# Patient Record
Sex: FEMALE | Race: WHITE | Employment: FULL TIME | URBAN - METROPOLITAN AREA
[De-identification: names, ages, dates, MRNs, and addresses within clinical notes are randomized per-mention and may not be internally consistent; named-entity substitution may affect disease eponyms.]

---

## 2019-11-20 ENCOUNTER — OFFICE VISIT (OUTPATIENT)
Dept: SURGERY | Age: 39
End: 2019-11-20

## 2019-11-20 VITALS
BODY MASS INDEX: 26.53 KG/M2 | SYSTOLIC BLOOD PRESSURE: 111 MMHG | DIASTOLIC BLOOD PRESSURE: 64 MMHG | WEIGHT: 169 LBS | HEIGHT: 67 IN | HEART RATE: 64 BPM

## 2019-11-20 DIAGNOSIS — D48.62 NEOPLASM OF UNCERTAIN BEHAVIOR OF LEFT BREAST: Primary | ICD-10-CM

## 2019-11-20 RX ORDER — ESCITALOPRAM OXALATE 10 MG/1
TABLET ORAL
COMMUNITY

## 2019-11-20 NOTE — LETTER
2019 9:53 AM 
 
Patient:  Andre Lazo YOB: 1980 Date of Visit: 2019 Dear Dr. Minaya Nessa: 
 
 
Thank you for referring Ms. Lester Wahl to me for evaluation/treatment. Below are the relevant portions of my assessment and plan of care. HISTORY OF PRESENT ILLNESS Andre Lazo is a 37 y.o. female. HPI 
NEW patient consult referred by  Dr. Candida Tello for LEFT breast atypical adipose tissue found on LEFT STBX. She went for screening mammogram and called back with LEFT dx mammogram showing architectural distortion. LEFT STBX pathology was atypical adipose tissue. She has no palpable lumps, nipple problems or skin changes. She is here with her . 
  
Family History: Mother, breast cancer, dx about age 61, survivor MGM, colon cancer, dx age unknown,  from other reason in her [de-identified] 
  
Imaging at [facility]: 
Mammogram, 10/24/19, BILATERAL screening, BIRADS 0 LEFT DX mammogram, 19, BIRADS 4 LEFT STBX done 19 
  
 
Past Medical History:  
Diagnosis Date  Thyroid disease   
 hyperthyroidism Past Surgical History:  
Procedure Laterality Date  HX WISDOM TEETH EXTRACTION Social History Socioeconomic History  Marital status:  Spouse name: Not on file  Number of children: Not on file  Years of education: Not on file  Highest education level: Not on file Occupational History  Not on file Social Needs  Financial resource strain: Not on file  Food insecurity:  
  Worry: Not on file Inability: Not on file  Transportation needs:  
  Medical: Not on file Non-medical: Not on file Tobacco Use  Smoking status: Never Smoker  Smokeless tobacco: Never Used Substance and Sexual Activity  Alcohol use: Yes Comment: once a month  Drug use: Never  Sexual activity: Not on file Lifestyle  Physical activity:  
  Days per week: Not on file Minutes per session: Not on file  Stress: Not on file Relationships  Social connections:  
  Talks on phone: Not on file Gets together: Not on file Attends Jewish service: Not on file Active member of club or organization: Not on file Attends meetings of clubs or organizations: Not on file Relationship status: Not on file  Intimate partner violence:  
  Fear of current or ex partner: Not on file Emotionally abused: Not on file Physically abused: Not on file Forced sexual activity: Not on file Other Topics Concern  Not on file Social History Narrative  Not on file Current Outpatient Medications on File Prior to Visit Medication Sig Dispense Refill  escitalopram oxalate (LEXAPRO) 10 mg tablet escitalopram 10 mg tablet 
 one po QD  methimazole (TAPAZOLE PO) methimazole No current facility-administered medications on file prior to visit. No Known Allergies OB History No obstetric history on file. Obstetric Comments Menarche 13, LMP 11/15/19, # of children 2, age of 1st delivery 27, Hysterectomy/oophorectomy no/no, Breast bx no, history of breast feeding yes, BCP yes, Hormone therapy no ROS Review of Systems Psychiatric/Behavioral: The patient is nervous/anxious. All other systems reviewed and are negative. Physical Exam 
Exam conducted with a chaperone present. Cardiovascular:  
   Rate and Rhythm: Normal rate and regular rhythm. Heart sounds: Normal heart sounds. Pulmonary:  
   Breath sounds: Normal breath sounds. Chest:  
   Breasts: Breasts are symmetrical.  
      Right: Normal. No swelling, bleeding, inverted nipple, mass, nipple discharge, skin change or tenderness. Left: Normal. No swelling, bleeding, inverted nipple, mass, nipple discharge, skin change or tenderness. Lymphadenopathy:  
   Cervical: Right cervical: No superficial, deep or posterior cervical adenopathy. Left cervical: No superficial, deep or posterior cervical adenopathy. Upper Body:  
   Right upper body: No supraclavicular or axillary adenopathy. Left upper body: No supraclavicular or axillary adenopathy. BREAST ULTRASOUND, Pre-op Planning Indication: Pre-op planning, LEFT breast UIQ Technique: The area was scanned using a high-frequency linear-array near-field transducer Findings: Bx clip and bx change visible on US at LEFT breast 11:00, 7cm form the nipple. Impression: Non-concordant bx result Disposition: Excisional biopsy ASSESSMENT and PLAN 
  ICD-10-CM ICD-9-CM 1. 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 Physical exam today normal. Bx clip and bx change visible on US at LEFT breast 11:00, 7cm form the nipple. Discussed excisional bx for non-concordant result, to confirm PATH and rule out atypical breast tissue or malignancy. Will plan for circumareolar incision, and will mobilize breast tissue to minimize cosmetic defect. Discussed that post-op nipple sensitivity will be expected to resolve with time. This will be an outpatient procedure under [facility], with sutures under the skin, and waterproof glue over the incision. Expected recovery time of about 3-4 days. Pt understands and consents to surgery. Will schedule for the near future, and scheduling will f/u with the date. This plan was reviewed with the patient and patient agrees. All questions were answered. Written by Lien Godinez, as dictated by Dr. Fabien Sinclair MD. 
 
If you have questions, please do not hesitate to call me. I look forward to following Ms. Rivas along with you.  
 
 
 
Sincerely, 
 
 
Clay Miller MD

## 2019-11-20 NOTE — PROGRESS NOTES
HISTORY OF PRESENT ILLNESS  Chris Rangel is a 37 y.o. female. HPI  NEW patient consult referred by  Dr. Soni Conroy for LEFT breast atypical adipose tissue found on LEFT STBX. She went for screening mammogram and called back with LEFT dx mammogram showing architectural distortion. LEFT STBX pathology was atypical adipose tissue. She has no palpable lumps, nipple problems or skin changes. She is here with her .     Family History:   Mother, breast cancer, dx about age 61, survivor  MGM, colon cancer, dx age unknown,  from other reason in her [de-identified]     Imaging at [facility]:  Mammogram, 10/24/19, BILATERAL screening, BIRADS 0  LEFT DX mammogram, 19, BIRADS 4  LEFT STBX done 19       Past Medical History:   Diagnosis Date    Thyroid disease     hyperthyroidism       Past Surgical History:   Procedure Laterality Date    HX WISDOM TEETH EXTRACTION         Social History     Socioeconomic History    Marital status:      Spouse name: Not on file    Number of children: Not on file    Years of education: Not on file    Highest education level: Not on file   Occupational History    Not on file   Social Needs    Financial resource strain: Not on file    Food insecurity:     Worry: Not on file     Inability: Not on file    Transportation needs:     Medical: Not on file     Non-medical: Not on file   Tobacco Use    Smoking status: Never Smoker    Smokeless tobacco: Never Used   Substance and Sexual Activity    Alcohol use: Yes     Comment: once a month    Drug use: Never    Sexual activity: Not on file   Lifestyle    Physical activity:     Days per week: Not on file     Minutes per session: Not on file    Stress: Not on file   Relationships    Social connections:     Talks on phone: Not on file     Gets together: Not on file     Attends Yazdanism service: Not on file     Active member of club or organization: Not on file     Attends meetings of clubs or organizations: Not on file     Relationship status: Not on file    Intimate partner violence:     Fear of current or ex partner: Not on file     Emotionally abused: Not on file     Physically abused: Not on file     Forced sexual activity: Not on file   Other Topics Concern    Not on file   Social History Narrative    Not on file       Current Outpatient Medications on File Prior to Visit   Medication Sig Dispense Refill    escitalopram oxalate (LEXAPRO) 10 mg tablet escitalopram 10 mg tablet   one po QD      methimazole (TAPAZOLE PO) methimazole       No current facility-administered medications on file prior to visit. No Known Allergies    OB History    No obstetric history on file. Obstetric Comments   Menarche 13, LMP 11/15/19, # of children 2, age of 4st delivery 27, Hysterectomy/oophorectomy no/no, Breast bx no, history of breast feeding yes, BCP yes, Hormone therapy no             ROS  Review of Systems   Psychiatric/Behavioral: The patient is nervous/anxious. All other systems reviewed and are negative. Physical Exam  Exam conducted with a chaperone present. Cardiovascular:      Rate and Rhythm: Normal rate and regular rhythm. Heart sounds: Normal heart sounds. Pulmonary:      Breath sounds: Normal breath sounds. Chest:      Breasts: Breasts are symmetrical.         Right: Normal. No swelling, bleeding, inverted nipple, mass, nipple discharge, skin change or tenderness. Left: Normal. No swelling, bleeding, inverted nipple, mass, nipple discharge, skin change or tenderness. Lymphadenopathy:      Cervical:      Right cervical: No superficial, deep or posterior cervical adenopathy. Left cervical: No superficial, deep or posterior cervical adenopathy. Upper Body:      Right upper body: No supraclavicular or axillary adenopathy. Left upper body: No supraclavicular or axillary adenopathy.        BREAST ULTRASOUND, Pre-op Planning  Indication: Pre-op planning, LEFT breast UIQ  Technique: The area was scanned using a high-frequency linear-array near-field transducer  Findings: Bx clip and bx change visible on US at LEFT breast 11:00, 7cm form the nipple. Impression: Non-concordant bx result  Disposition: Excisional biopsy    ASSESSMENT and PLAN    ICD-10-CM ICD-9-CM    1. 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 Physical exam today normal. Bx clip and bx change visible on US at LEFT breast 11:00, 7cm form the nipple. Discussed excisional bx for non-concordant result, to confirm PATH and rule out atypical breast tissue or malignancy. Will plan for circumareolar incision, and will mobilize breast tissue to minimize cosmetic defect. Discussed that post-op nipple sensitivity will be expected to resolve with time. This will be an outpatient procedure under [place], with sutures under the skin, and waterproof glue over the incision. Expected recovery time of about 3-4 days. Pt understands and consents to surgery. Will schedule for the near future, and scheduling will f/u with the date. This plan was reviewed with the patient and patient agrees. All questions were answered.     Written by Zully Montez, as dictated by Dr. Kristina Reeves MD.

## 2019-11-20 NOTE — PROGRESS NOTES
HISTORY OF PRESENT ILLNESS Marylee Casey is a 37 y.o. female. HPI  NEW patient consult referred by  Dr. Yasmeen Heard for LEFT breast atypical adipose tissue found on LEFT STBX. She went for screening mammogram and called back with LEFT dx mammogram showing architectural distortion. LEFT STBX pathology was atypical adipose tissue. She has no palpable lumps, nipple problems or skin changes. She is here with her . Family History: Mother, breast cancer, dx about age 61, survivor MGM, colon cancer, dx age unknown,  from other reason in her [de-identified] Imaging at [facility]: 
Mammogram, 10/24/19, BILATERAL screening, BIRADS 0 LEFT DX mammogram, 19, BIRADS 4 LEFT STBX done 19 Review of Systems Psychiatric/Behavioral: The patient is nervous/anxious. All other systems reviewed and are negative. Physical Exam 
 
ASSESSMENT and PLAN 
{ASSESSMENT/PLAN:[id number]}

## 2019-11-22 NOTE — COMMUNICATION BODY
HISTORY OF PRESENT ILLNESS Lianna Gonzalez is a 37 y.o. female. HPI 
NEW patient consult referred by  Dr. Kristal Baer for LEFT breast atypical adipose tissue found on LEFT STBX. She went for screening mammogram and called back with LEFT dx mammogram showing architectural distortion. LEFT STBX pathology was atypical adipose tissue. She has no palpable lumps, nipple problems or skin changes. She is here with her . 
  
Family History: Mother, breast cancer, dx about age 61, survivor MGM, colon cancer, dx age unknown,  from other reason in her [de-identified] 
  
Imaging at [facility]: 
Mammogram, 10/24/19, BILATERAL screening, BIRADS 0 LEFT DX mammogram, 19, BIRADS 4 LEFT STBX done 19 
  
 
Past Medical History:  
Diagnosis Date  Thyroid disease   
 hyperthyroidism Past Surgical History:  
Procedure Laterality Date  HX WISDOM TEETH EXTRACTION Social History Socioeconomic History  Marital status:  Spouse name: Not on file  Number of children: Not on file  Years of education: Not on file  Highest education level: Not on file Occupational History  Not on file Social Needs  Financial resource strain: Not on file  Food insecurity:  
  Worry: Not on file Inability: Not on file  Transportation needs:  
  Medical: Not on file Non-medical: Not on file Tobacco Use  Smoking status: Never Smoker  Smokeless tobacco: Never Used Substance and Sexual Activity  Alcohol use: Yes Comment: once a month  Drug use: Never  Sexual activity: Not on file Lifestyle  Physical activity:  
  Days per week: Not on file Minutes per session: Not on file  Stress: Not on file Relationships  Social connections:  
  Talks on phone: Not on file Gets together: Not on file Attends Yazdanism service: Not on file Active member of club or organization: Not on file Attends meetings of clubs or organizations: Not on file Relationship status: Not on file  Intimate partner violence:  
  Fear of current or ex partner: Not on file Emotionally abused: Not on file Physically abused: Not on file Forced sexual activity: Not on file Other Topics Concern  Not on file Social History Narrative  Not on file Current Outpatient Medications on File Prior to Visit Medication Sig Dispense Refill  escitalopram oxalate (LEXAPRO) 10 mg tablet escitalopram 10 mg tablet 
 one po QD  methimazole (TAPAZOLE PO) methimazole No current facility-administered medications on file prior to visit. No Known Allergies OB History No obstetric history on file. Obstetric Comments Menarche 13, LMP 11/15/19, # of children 2, age of 1st delivery 27, Hysterectomy/oophorectomy no/no, Breast bx no, history of breast feeding yes, BCP yes, Hormone therapy no ROS Review of Systems Psychiatric/Behavioral: The patient is nervous/anxious. All other systems reviewed and are negative. Physical Exam 
Exam conducted with a chaperone present. Cardiovascular:  
   Rate and Rhythm: Normal rate and regular rhythm. Heart sounds: Normal heart sounds. Pulmonary:  
   Breath sounds: Normal breath sounds. Chest:  
   Breasts: Breasts are symmetrical.  
      Right: Normal. No swelling, bleeding, inverted nipple, mass, nipple discharge, skin change or tenderness. Left: Normal. No swelling, bleeding, inverted nipple, mass, nipple discharge, skin change or tenderness. Lymphadenopathy:  
   Cervical:  
   Right cervical: No superficial, deep or posterior cervical adenopathy. Left cervical: No superficial, deep or posterior cervical adenopathy. Upper Body:  
   Right upper body: No supraclavicular or axillary adenopathy. Left upper body: No supraclavicular or axillary adenopathy. BREAST ULTRASOUND, Pre-op Planning Indication: Pre-op planning, LEFT breast UIQ Technique: The area was scanned using a high-frequency linear-array near-field transducer Findings: Bx clip and bx change visible on US at LEFT breast 11:00, 7cm form the nipple. Impression: Non-concordant bx result Disposition: Excisional biopsy ASSESSMENT and PLAN 
  ICD-10-CM ICD-9-CM 1. 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 Physical exam today normal. Bx clip and bx change visible on US at LEFT breast 11:00, 7cm form the nipple. Discussed excisional bx for non-concordant result, to confirm PATH and rule out atypical breast tissue or malignancy. Will plan for circumareolar incision, and will mobilize breast tissue to minimize cosmetic defect. Discussed that post-op nipple sensitivity will be expected to resolve with time. This will be an outpatient procedure under [place], with sutures under the skin, and waterproof glue over the incision. Expected recovery time of about 3-4 days. Pt understands and consents to surgery. Will schedule for the near future, and scheduling will f/u with the date. This plan was reviewed with the patient and patient agrees. All questions were answered.  
 
Written by Attila Archer, as dictated by Dr. Carl Fowler MD.

## 2019-11-25 DIAGNOSIS — D48.62 NEOPLASM OF UNCERTAIN BEHAVIOR OF LEFT BREAST: Primary | ICD-10-CM

## 2019-12-03 ENCOUNTER — ANESTHESIA EVENT (OUTPATIENT)
Dept: MEDSURG UNIT | Age: 39
End: 2019-12-03
Payer: COMMERCIAL

## 2019-12-04 DIAGNOSIS — D48.62 NEOPLASM OF UNCERTAIN BEHAVIOR OF LEFT BREAST: Primary | ICD-10-CM

## 2019-12-05 ENCOUNTER — ANESTHESIA (OUTPATIENT)
Dept: MEDSURG UNIT | Age: 39
End: 2019-12-05
Payer: COMMERCIAL

## 2019-12-05 ENCOUNTER — HOSPITAL ENCOUNTER (OUTPATIENT)
Age: 39
Setting detail: OUTPATIENT SURGERY
Discharge: HOME | End: 2019-12-05
Attending: SURGERY | Admitting: SURGERY
Payer: COMMERCIAL

## 2019-12-05 VITALS
TEMPERATURE: 97.8 F | WEIGHT: 165 LBS | HEART RATE: 88 BPM | SYSTOLIC BLOOD PRESSURE: 120 MMHG | RESPIRATION RATE: 12 BRPM | OXYGEN SATURATION: 99 % | HEIGHT: 67 IN | DIASTOLIC BLOOD PRESSURE: 67 MMHG | BODY MASS INDEX: 25.9 KG/M2

## 2019-12-05 DIAGNOSIS — D48.62 NEOPLASM OF UNCERTAIN BEHAVIOR OF LEFT BREAST: ICD-10-CM

## 2019-12-05 LAB — HCG UR QL: NEGATIVE

## 2019-12-05 PROCEDURE — 76210000034 HC AMBSU PH I REC 0.5 TO 1 HR: Performed by: SURGERY

## 2019-12-05 PROCEDURE — 77030002933 HC SUT MCRYL J&J -A: Performed by: SURGERY

## 2019-12-05 PROCEDURE — 76210000046 HC AMBSU PH II REC FIRST 0.5 HR: Performed by: SURGERY

## 2019-12-05 PROCEDURE — 77030018836 HC SOL IRR NACL ICUM -A: Performed by: SURGERY

## 2019-12-05 PROCEDURE — 77030011267 HC ELECTRD BLD COVD -A: Performed by: SURGERY

## 2019-12-05 PROCEDURE — 77030011640 HC PAD GRND REM COVD -A: Performed by: SURGERY

## 2019-12-05 PROCEDURE — 77030010507 HC ADH SKN DERMBND J&J -B: Performed by: SURGERY

## 2019-12-05 PROCEDURE — 77030010509 HC AIRWY LMA MSK TELE -A: Performed by: ANESTHESIOLOGY

## 2019-12-05 PROCEDURE — 77030002996 HC SUT SLK J&J -A: Performed by: SURGERY

## 2019-12-05 PROCEDURE — 88305 TISSUE EXAM BY PATHOLOGIST: CPT

## 2019-12-05 PROCEDURE — 74011000250 HC RX REV CODE- 250: Performed by: SURGERY

## 2019-12-05 PROCEDURE — 74011250636 HC RX REV CODE- 250/636: Performed by: NURSE ANESTHETIST, CERTIFIED REGISTERED

## 2019-12-05 PROCEDURE — 77030031139 HC SUT VCRL2 J&J -A: Performed by: SURGERY

## 2019-12-05 PROCEDURE — 77030040361 HC SLV COMPR DVT MDII -B: Performed by: SURGERY

## 2019-12-05 PROCEDURE — 74011000250 HC RX REV CODE- 250: Performed by: NURSE ANESTHETIST, CERTIFIED REGISTERED

## 2019-12-05 PROCEDURE — 76030000001 HC AMB SURG OR TIME 1 TO 1.5: Performed by: SURGERY

## 2019-12-05 PROCEDURE — 74011250637 HC RX REV CODE- 250/637: Performed by: ANESTHESIOLOGY

## 2019-12-05 PROCEDURE — 81025 URINE PREGNANCY TEST: CPT

## 2019-12-05 PROCEDURE — 76060000062 HC AMB SURG ANES 1 TO 1.5 HR: Performed by: SURGERY

## 2019-12-05 PROCEDURE — 74011250636 HC RX REV CODE- 250/636: Performed by: ANESTHESIOLOGY

## 2019-12-05 RX ORDER — LIDOCAINE HYDROCHLORIDE 20 MG/ML
INJECTION, SOLUTION EPIDURAL; INFILTRATION; INTRACAUDAL; PERINEURAL AS NEEDED
Status: DISCONTINUED | OUTPATIENT
Start: 2019-12-05 | End: 2019-12-05 | Stop reason: HOSPADM

## 2019-12-05 RX ORDER — ONDANSETRON 2 MG/ML
4 INJECTION INTRAMUSCULAR; INTRAVENOUS AS NEEDED
Status: DISCONTINUED | OUTPATIENT
Start: 2019-12-05 | End: 2019-12-05 | Stop reason: HOSPADM

## 2019-12-05 RX ORDER — FENTANYL CITRATE 50 UG/ML
50 INJECTION, SOLUTION INTRAMUSCULAR; INTRAVENOUS AS NEEDED
Status: DISCONTINUED | OUTPATIENT
Start: 2019-12-05 | End: 2019-12-05 | Stop reason: HOSPADM

## 2019-12-05 RX ORDER — HYDROCODONE BITARTRATE AND ACETAMINOPHEN 7.5; 325 MG/1; MG/1
1 TABLET ORAL
Qty: 20 TAB | Refills: 0 | Status: SHIPPED | OUTPATIENT
Start: 2019-12-05 | End: 2019-12-10

## 2019-12-05 RX ORDER — MIDAZOLAM HYDROCHLORIDE 1 MG/ML
1 INJECTION, SOLUTION INTRAMUSCULAR; INTRAVENOUS AS NEEDED
Status: DISCONTINUED | OUTPATIENT
Start: 2019-12-05 | End: 2019-12-05 | Stop reason: HOSPADM

## 2019-12-05 RX ORDER — PROPOFOL 10 MG/ML
INJECTION, EMULSION INTRAVENOUS AS NEEDED
Status: DISCONTINUED | OUTPATIENT
Start: 2019-12-05 | End: 2019-12-05 | Stop reason: HOSPADM

## 2019-12-05 RX ORDER — MORPHINE SULFATE 10 MG/ML
2 INJECTION, SOLUTION INTRAMUSCULAR; INTRAVENOUS
Status: DISCONTINUED | OUTPATIENT
Start: 2019-12-05 | End: 2019-12-05 | Stop reason: HOSPADM

## 2019-12-05 RX ORDER — GLYCOPYRROLATE 0.2 MG/ML
0.2 INJECTION INTRAMUSCULAR; INTRAVENOUS
Status: DISCONTINUED | OUTPATIENT
Start: 2019-12-05 | End: 2019-12-05 | Stop reason: HOSPADM

## 2019-12-05 RX ORDER — ALBUTEROL SULFATE 0.83 MG/ML
2.5 SOLUTION RESPIRATORY (INHALATION) AS NEEDED
Status: DISCONTINUED | OUTPATIENT
Start: 2019-12-05 | End: 2019-12-05 | Stop reason: HOSPADM

## 2019-12-05 RX ORDER — SODIUM CHLORIDE 9 MG/ML
25 INJECTION, SOLUTION INTRAVENOUS CONTINUOUS
Status: DISCONTINUED | OUTPATIENT
Start: 2019-12-05 | End: 2019-12-05 | Stop reason: HOSPADM

## 2019-12-05 RX ORDER — BUPIVACAINE HYDROCHLORIDE AND EPINEPHRINE 5; 5 MG/ML; UG/ML
30 INJECTION, SOLUTION EPIDURAL; INTRACAUDAL; PERINEURAL ONCE
Status: CANCELLED | OUTPATIENT
Start: 2019-12-05 | End: 2019-12-05

## 2019-12-05 RX ORDER — ONDANSETRON 2 MG/ML
INJECTION INTRAMUSCULAR; INTRAVENOUS AS NEEDED
Status: DISCONTINUED | OUTPATIENT
Start: 2019-12-05 | End: 2019-12-05 | Stop reason: HOSPADM

## 2019-12-05 RX ORDER — DIPHENHYDRAMINE HYDROCHLORIDE 50 MG/ML
12.5 INJECTION, SOLUTION INTRAMUSCULAR; INTRAVENOUS AS NEEDED
Status: DISCONTINUED | OUTPATIENT
Start: 2019-12-05 | End: 2019-12-05 | Stop reason: HOSPADM

## 2019-12-05 RX ORDER — HYDROMORPHONE HYDROCHLORIDE 1 MG/ML
0.2 INJECTION, SOLUTION INTRAMUSCULAR; INTRAVENOUS; SUBCUTANEOUS
Status: DISCONTINUED | OUTPATIENT
Start: 2019-12-05 | End: 2019-12-05 | Stop reason: HOSPADM

## 2019-12-05 RX ORDER — LIDOCAINE HYDROCHLORIDE 10 MG/ML
0.1 INJECTION, SOLUTION EPIDURAL; INFILTRATION; INTRACAUDAL; PERINEURAL AS NEEDED
Status: DISCONTINUED | OUTPATIENT
Start: 2019-12-05 | End: 2019-12-05 | Stop reason: HOSPADM

## 2019-12-05 RX ORDER — ROPIVACAINE HYDROCHLORIDE 5 MG/ML
30 INJECTION, SOLUTION EPIDURAL; INFILTRATION; PERINEURAL AS NEEDED
Status: DISCONTINUED | OUTPATIENT
Start: 2019-12-05 | End: 2019-12-05 | Stop reason: HOSPADM

## 2019-12-05 RX ORDER — FENTANYL CITRATE 50 UG/ML
INJECTION, SOLUTION INTRAMUSCULAR; INTRAVENOUS AS NEEDED
Status: DISCONTINUED | OUTPATIENT
Start: 2019-12-05 | End: 2019-12-05 | Stop reason: HOSPADM

## 2019-12-05 RX ORDER — SODIUM CHLORIDE, SODIUM LACTATE, POTASSIUM CHLORIDE, CALCIUM CHLORIDE 600; 310; 30; 20 MG/100ML; MG/100ML; MG/100ML; MG/100ML
1000 INJECTION, SOLUTION INTRAVENOUS CONTINUOUS
Status: DISCONTINUED | OUTPATIENT
Start: 2019-12-05 | End: 2019-12-05 | Stop reason: HOSPADM

## 2019-12-05 RX ORDER — LIDOCAINE HYDROCHLORIDE AND EPINEPHRINE 10; 10 MG/ML; UG/ML
30 INJECTION, SOLUTION INFILTRATION; PERINEURAL ONCE
Status: CANCELLED | OUTPATIENT
Start: 2019-12-05 | End: 2019-12-05

## 2019-12-05 RX ORDER — DEXAMETHASONE SODIUM PHOSPHATE 4 MG/ML
INJECTION, SOLUTION INTRA-ARTICULAR; INTRALESIONAL; INTRAMUSCULAR; INTRAVENOUS; SOFT TISSUE AS NEEDED
Status: DISCONTINUED | OUTPATIENT
Start: 2019-12-05 | End: 2019-12-05 | Stop reason: HOSPADM

## 2019-12-05 RX ORDER — HYDROCODONE BITARTRATE AND ACETAMINOPHEN 5; 325 MG/1; MG/1
1 TABLET ORAL AS NEEDED
Status: DISCONTINUED | OUTPATIENT
Start: 2019-12-05 | End: 2019-12-05 | Stop reason: HOSPADM

## 2019-12-05 RX ORDER — ACETAMINOPHEN 325 MG/1
650 TABLET ORAL ONCE
Status: COMPLETED | OUTPATIENT
Start: 2019-12-05 | End: 2019-12-05

## 2019-12-05 RX ORDER — MIDAZOLAM HYDROCHLORIDE 1 MG/ML
0.5 INJECTION, SOLUTION INTRAMUSCULAR; INTRAVENOUS
Status: DISCONTINUED | OUTPATIENT
Start: 2019-12-05 | End: 2019-12-05 | Stop reason: HOSPADM

## 2019-12-05 RX ORDER — FENTANYL CITRATE 50 UG/ML
25 INJECTION, SOLUTION INTRAMUSCULAR; INTRAVENOUS
Status: DISCONTINUED | OUTPATIENT
Start: 2019-12-05 | End: 2019-12-05 | Stop reason: HOSPADM

## 2019-12-05 RX ORDER — MIDAZOLAM HYDROCHLORIDE 1 MG/ML
INJECTION, SOLUTION INTRAMUSCULAR; INTRAVENOUS AS NEEDED
Status: DISCONTINUED | OUTPATIENT
Start: 2019-12-05 | End: 2019-12-05 | Stop reason: HOSPADM

## 2019-12-05 RX ADMIN — LIDOCAINE HYDROCHLORIDE 80 MG: 20 INJECTION, SOLUTION EPIDURAL; INFILTRATION; INTRACAUDAL; PERINEURAL at 11:12

## 2019-12-05 RX ADMIN — MIDAZOLAM 2 MG: 1 INJECTION INTRAMUSCULAR; INTRAVENOUS at 11:06

## 2019-12-05 RX ADMIN — PROPOFOL 300 MG: 10 INJECTION, EMULSION INTRAVENOUS at 11:12

## 2019-12-05 RX ADMIN — ACETAMINOPHEN 650 MG: 325 TABLET ORAL at 10:36

## 2019-12-05 RX ADMIN — SODIUM CHLORIDE, SODIUM LACTATE, POTASSIUM CHLORIDE, AND CALCIUM CHLORIDE 1000 ML: 600; 310; 30; 20 INJECTION, SOLUTION INTRAVENOUS at 10:37

## 2019-12-05 RX ADMIN — FENTANYL CITRATE 50 MCG: 50 INJECTION, SOLUTION INTRAMUSCULAR; INTRAVENOUS at 11:48

## 2019-12-05 RX ADMIN — DEXAMETHASONE SODIUM PHOSPHATE 8 MG: 4 INJECTION, SOLUTION INTRAMUSCULAR; INTRAVENOUS at 11:29

## 2019-12-05 RX ADMIN — PROPOFOL 100 MG: 10 INJECTION, EMULSION INTRAVENOUS at 11:47

## 2019-12-05 RX ADMIN — ONDANSETRON HYDROCHLORIDE 4 MG: 2 INJECTION, SOLUTION INTRAVENOUS at 11:29

## 2019-12-05 NOTE — H&P
HISTORY OF PRESENT ILLNESS  Caryle Aus is a 37 y.o. female. HPI  NEW patient consult referred by  Dr. Molly Davis LEFT breast atypical adipose tissue found on LEFT STBX. She went for screening mammogram and called back with LEFT dx mammogram showing architectural distortion. LEFT STBX pathology was atypical adipose tissue. She has no palpable lumps, nipple problems or skin changes. She is here with her .     Family History: Mother, breast cancer, dx about age 61, survivor  MGM, colon cancer, dx age unknown,  from other reason in her [de-identified]     Imaging at [facility]:  Mammogram, 10/24/19, BILATERAL screening, BIRADS 0  LEFT DX mammogram, 19, BIRADS 4  LEFT STBX done 19             Past Medical History:   Diagnosis Date    Thyroid disease       hyperthyroidism               Past Surgical History:   Procedure Laterality Date    HX WISDOM TEETH EXTRACTION             Social History            Socioeconomic History    Marital status:        Spouse name: Not on file    Number of children: Not on file    Years of education: Not on file    Highest education level: Not on file   Occupational History    Not on file   Social Needs    Financial resource strain: Not on file    Food insecurity:       Worry: Not on file       Inability: Not on file    Transportation needs:       Medical: Not on file       Non-medical: Not on file   Tobacco Use    Smoking status: Never Smoker    Smokeless tobacco: Never Used   Substance and Sexual Activity    Alcohol use:  Yes       Comment: once a month    Drug use: Never    Sexual activity: Not on file   Lifestyle    Physical activity:       Days per week: Not on file       Minutes per session: Not on file    Stress: Not on file   Relationships    Social connections:       Talks on phone: Not on file       Gets together: Not on file       Attends Confucianism service: Not on file       Active member of club or organization: Not on file       Attends meetings of clubs or organizations: Not on file       Relationship status: Not on file    Intimate partner violence:       Fear of current or ex partner: Not on file       Emotionally abused: Not on file       Physically abused: Not on file       Forced sexual activity: Not on file   Other Topics Concern    Not on file   Social History Narrative    Not on file                Current Outpatient Medications on File Prior to Visit   Medication Sig Dispense Refill    escitalopram oxalate (LEXAPRO) 10 mg tablet escitalopram 10 mg tablet   one po QD        methimazole (TAPAZOLE PO) methimazole          No current facility-administered medications on file prior to visit.          No Known Allergies     OB History    No obstetric history on file.       Obstetric Comments   Menarche 13, LMP 11/15/19, # of children 2, age of 1st delivery 27, Hysterectomy/oophorectomy no/no, Breast bx no, history of breast feeding yes, BCP yes, Hormone therapy no                ROS  Review of Systems   Psychiatric/Behavioral: The patient is nervous/anxious.    All other systems reviewed and are negative.     Physical Exam  Exam conducted with a chaperone present. Cardiovascular:      Rate and Rhythm: Normal rate and regular rhythm. Heart sounds: Normal heart sounds. Pulmonary:      Breath sounds: Normal breath sounds. Chest:      Breasts: Breasts are symmetrical.         Right: Normal. No swelling, bleeding, inverted nipple, mass, nipple discharge, skin change or tenderness. Left: Normal. No swelling, bleeding, inverted nipple, mass, nipple discharge, skin change or tenderness. Lymphadenopathy:      Cervical:      Right cervical: No superficial, deep or posterior cervical adenopathy. Left cervical: No superficial, deep or posterior cervical adenopathy. Upper Body:      Right upper body: No supraclavicular or axillary adenopathy. Left upper body: No supraclavicular or axillary adenopathy.    BREAST ULTRASOUND, Pre-op Planning  Indication: Pre-op planning, LEFT breast UIQ  Technique: The area was scanned using a high-frequency linear-array near-field transducer  Findings: Bx clip and bx change visible on US at LEFT breast 11:00, 7cm form the nipple. Impression: Non-concordant bx result  Disposition: Excisional biopsy     ASSESSMENT and PLAN      ICD-10-CM ICD-9-CM     1. 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 3        New patient presents for evaluation of atypical LEFT breast adipose tissue, and is doing well overall. Physical exam today normal. Bx clip and bx change visible on US at LEFT breast 11:00, 7cm form the nipple. Discussed excisional bx for non-concordant result, to confirm PATH and rule out atypical breast tissue or malignancy. Will plan for circumareolar incision, and will mobilize breast tissue to minimize cosmetic defect. Discussed that post-op nipple sensitivity will be expected to resolve with time. This will be an outpatient procedure under [facility], with sutures under the skin, and waterproof glue over the incision. Expected recovery time of about 3-4 days. Pt understands and consents to surgery. Will schedule for the near future, and scheduling will f/u with the date. This plan was reviewed with the patient and patient agrees. All questions were answered.

## 2019-12-05 NOTE — OP NOTES
[facility]  OPERATIVE REPORT    Name:  Verona Bush  MR#:  [id number]  :  1980  ACCOUNT #:  [de-identified]  DATE OF SERVICE:  2019      PREOPERATIVE DIAGNOSIS:  Atypical fatty tissue on core biopsy. POSTOPERATIVE DIAGNOSIS:  Atypical fatty tissue on core biopsy. PROCEDURE PERFORMED:  Left breast excisional biopsy with intraoperative ultrasound. SURGEON:  Thomos Bumpers. Jb Rawls MD    ASSISTANT:  Huan Chappell. ANESTHESIA:  General.    COMPLICATIONS:  None. SPECIMENS REMOVED:  Breast tissue with margins. IMPLANTS:  None. ESTIMATED BLOOD LOSS:  Minimal.    INDICATIONS:  The patient is a 39-year-old female with stereotactic biopsy for an area of architectural distortion, which turned atypical fatty tissue, which we felt was of nonconcordant result, therefore, an excisional biopsy was performed. PROCEDURE:  After satisfactory induction of general LMA anesthesia, the patient was prepped and draped in sterile fashion. Intraoperative ultrasound was performed and breast was marked. A circumareolar incision was made and deepened through the subcutaneous tissues with Bovie cautery. A skin flap was raised superiorly to the area of the biopsy site and standard biopsy was performed. The specimen radiographs was obtained, which did not reveal the clip. Additional medial margins was obtained, which did reveal the clip and the tissue. All specimens were sent to pathology after being oriented. The wound was hemostatic, anesthetized with 0.5% Marcaine, and closed with interrupted 2-0 Vicryl and a running subcuticular 4-0 Monocryl on the skin. The patient tolerated the procedure well. No complications. She was taken to the recovery room in stable condition.         Daniela Marquez MD [email or web address]  D:  2019 12:35  T:  2019 17:36  JOB #:  [id number]  CC:  Dusty Hancock MD

## 2019-12-05 NOTE — DISCHARGE INSTRUCTIONS
Discharge Instructions from Dr. Zac Catalan    · I will call you with the pathology results, typically within 1 week from today. · You may shower, but no hot tubs, swimming pools, or baths until your incision is healed. · No heavy lifting with the affected extremity (nothing greater than 5 pounds), and limit its use for the next 4-5 days. · You may use an ice pack for comfort for the next couple of days, but do not place ice directly on the skin. Rather, use a towel or clothing to serve as a barrier between skin and ice to prevent injury. · If I placed a drain, follow the drain instructions provided, especially as you keep a record of the drain output. · Follow medication instructions carefully. · Watch for signs of infection as listed below. · Redness  · Swelling  · Drainage from the incision or from your nipple that appears infected  · Fever over 101 degrees for consecutive readings, or over 99.5 if you are currently undergoing chemotherapy. · Call our office (number is below) for a follow-up appointment. · If you have any problems, our phone number is [phone]. Bibi Elise received Tylenol at 10:30 this morning, she can have her next dose at 4:30 this afternoon.

## 2019-12-05 NOTE — ANESTHESIA PREPROCEDURE EVALUATION
Relevant Problems   No relevant active problems       Anesthetic History   No history of anesthetic complications            Review of Systems / Medical History  Patient summary reviewed, nursing notes reviewed and pertinent labs reviewed    Pulmonary  Within defined limits                 Neuro/Psych   Within defined limits           Cardiovascular  Within defined limits                Exercise tolerance: >4 METS     GI/Hepatic/Renal  Within defined limits              Endo/Other      Hyperthyroidism: well controlled       Other Findings              Physical Exam    Airway  Mallampati: II  TM Distance: > 6 cm  Neck ROM: normal range of motion   Mouth opening: Normal     Cardiovascular  Regular rate and rhythm,  S1 and S2 normal,  no murmur, click, rub, or gallop             Dental  No notable dental hx       Pulmonary  Breath sounds clear to auscultation               Abdominal  GI exam deferred       Other Findings            Anesthetic Plan    ASA: 2  Anesthesia type: general          Induction: Intravenous  Anesthetic plan and risks discussed with: Patient

## 2019-12-05 NOTE — BRIEF OP NOTE
BRIEF OPERATIVE NOTE    Date of Procedure: 12/5/2019   Preoperative Diagnosis: NEOPLASM OF LEFT BREAST  Postoperative Diagnosis: NEOPLASM LEFT BREAST    Procedure(s):  LEFT BREAST EXCISIONAL BIOPSY WITH ULTRASOUND  Surgeon(s) and Role:     * Anil Medrano MD - Primary         Surgical Assistant: Wendy Alarcon    Surgical Staff:  Circ-1: Kirsten Falcon RN  Registered Nurse Assistant: Willow Reese RN  Scrub Tech-1: Merry JIM  Event Time In Time Out   Incision Start 1141    Incision Close 1220      Anesthesia: General   Estimated Blood Loss: minimal  Specimens:   ID Type Source Tests Collected by Time Destination   1 : Анна Branch, STITCH ON NEW MARGIN Fresh Breast  Anil Medrano MD 12/5/2019 1202 Pathology   2 : LEFT BREAST EXCISIONAL BIOPSY, SINGLE STITCH ANTERIOR, SHORT DOUBLE IS SUPERIOR, LONG DOUBLE IS LATERAL Fresh Breast  Anil Medrano MD 12/5/2019 1205 Pathology      Findings: spec radiograph pos clip, bx with margins   Complications: none  Implants: * No implants in log *

## 2019-12-05 NOTE — ANESTHESIA POSTPROCEDURE EVALUATION
Post-Anesthesia Evaluation and Assessment    Patient: Rico Nair MRN: [id number]  SSN: [id number]    YOB: 1980  Age: 37 y.o. Sex: female      I have evaluated the patient and they are stable and ready for discharge from the PACU. Cardiovascular Function/Vital Signs  Visit Vitals  /67 (BP 1 Location: Left arm, BP Patient Position: At rest)   Pulse 88   Temp 36.6 °C (97.8 °F)   Resp 12   Ht 5' 6.5\" (1.689 m)   Wt 74.8 kg (165 lb)   SpO2 99%   BMI 26.23 kg/m²       Patient is status post General anesthesia for Procedure(s):  LEFT BREAST EXCISIONAL BIOPSY WITH ULTRASOUND. Nausea/Vomiting: None    Postoperative hydration reviewed and adequate. Pain:  Pain Scale 1: Numeric (0 - 10) (12/05/19 1313)  Pain Intensity 1: 0 (12/05/19 1313)   Managed    Neurological Status:   Neuro (WDL): Within Defined Limits (12/05/19 1300)  Neuro  LUE Motor Response: Purposeful (12/05/19 1300)  LLE Motor Response: Purposeful (12/05/19 1300)  RUE Motor Response: Purposeful (12/05/19 1300)  RLE Motor Response: Purposeful (12/05/19 1300)   At baseline    Mental Status, Level of Consciousness: Alert and  oriented to person, place, and time    Pulmonary Status:   O2 Device: Room air (12/05/19 1313)   Adequate oxygenation and airway patent    Complications related to anesthesia: None    Post-anesthesia assessment completed. No concerns    Signed By: Guanaco Fernandez MD     December 5, 2019              Procedure(s):  LEFT BREAST EXCISIONAL BIOPSY WITH ULTRASOUND.     general    <BSHSIANPOST>    Vitals Value Taken Time   /67 12/5/2019  1:13 PM   Temp 36.6 °C (97.8 °F) 12/5/2019  1:13 PM   Pulse 88 12/5/2019  1:13 PM   Resp 12 12/5/2019  1:13 PM   SpO2 99 % 12/5/2019  1:13 PM

## 2019-12-05 NOTE — ROUTINE PROCESS
Patient: Eddie Potts MRN: [id number]  SSN: [id number] YOB: 1980  Age: 37 y.o. Sex: female Patient is status post Procedure(s): LEFT BREAST EXCISIONAL BIOPSY WITH ULTRASOUND. Surgeon(s) and Role: Julius Mauricio MD - Primary Local/Dose/Irrigation:   
 
             
Peripheral IV 12/05/19 Right Hand (Active) Dressing/Packing:  Wound Breast Left-Dressing Type: (DERMABOND) (12/05/19 1100) Splint/Cast:  ] Other:

## 2019-12-09 ENCOUNTER — TELEPHONE (OUTPATIENT)
Dept: SURGERY | Age: 39
End: 2019-12-09

## 2019-12-09 NOTE — TELEPHONE ENCOUNTER
Called over the weekend with concerns about redness. Sent pic, appeared to be bruising. Checked on her yesterday and she was doing fine.  followup as scheduled

## 2019-12-27 ENCOUNTER — OFFICE VISIT (OUTPATIENT)
Dept: SURGERY | Age: 39
End: 2019-12-27

## 2019-12-27 VITALS
SYSTOLIC BLOOD PRESSURE: 118 MMHG | HEART RATE: 76 BPM | HEIGHT: 67 IN | BODY MASS INDEX: 25.9 KG/M2 | WEIGHT: 165 LBS | DIASTOLIC BLOOD PRESSURE: 62 MMHG

## 2019-12-27 DIAGNOSIS — N64.1 FAT NECROSIS OF BREAST: Primary | ICD-10-CM

## 2019-12-27 NOTE — COMMUNICATION BODY
HISTORY OF PRESENT ILLNESS  Rico Nair is a 37 y.o. female. HPI  ESTABLISHED patient here for post op follow up, s/p LEFT breast excisional biopsy. She is doing well. Denies pain. Incisions are dry and intact. No redness or swelling. 12/5/19 - LEFT breast excisional biopsy  FINAL PATHOLOGIC DIAGNOSIS   1. Breast, left medial margin, excision:   Breast tissue with fat necrosis, stromal fibrosis, and biopsy site reaction   Biopsy clip identified   No in situ or invasive carcinoma identified   2.  Breast, left, excision:   Breast tissue with fat necrosis, focal pseudo-lactational change, stromal fibrosis, and biopsy site reaction   No in situ or invasive carcinoma identified       Past Medical History:   Diagnosis Date    Thyroid disease     hyperthyroidism       Past Surgical History:   Procedure Laterality Date    HX BREAST BIOPSY Left 12/5/2019    LEFT BREAST EXCISIONAL BIOPSY WITH ULTRASOUND performed by Rosan Holstein., MD at [facility]    HX WISDOM TEETH EXTRACTION         Social History     Socioeconomic History    Marital status:      Spouse name: Not on file    Number of children: Not on file    Years of education: Not on file    Highest education level: Not on file   Occupational History    Not on file   Social Needs    Financial resource strain: Not on file    Food insecurity:     Worry: Not on file     Inability: Not on file    Transportation needs:     Medical: Not on file     Non-medical: Not on file   Tobacco Use    Smoking status: Never Smoker    Smokeless tobacco: Never Used   Substance and Sexual Activity    Alcohol use: Yes     Comment: once a month    Drug use: Never    Sexual activity: Not on file   Lifestyle    Physical activity:     Days per week: Not on file     Minutes per session: Not on file    Stress: Not on file   Relationships    Social connections:     Talks on phone: Not on file     Gets together: Not on file     Attends Pent[employer]alism service: Not on file     Active member of club or organization: Not on file     Attends meetings of clubs or organizations: Not on file     Relationship status: Not on file    Intimate partner violence:     Fear of current or ex partner: Not on file     Emotionally abused: Not on file     Physically abused: Not on file     Forced sexual activity: Not on file   Other Topics Concern    Not on file   Social History Narrative    Not on file       Current Outpatient Medications on File Prior to Visit   Medication Sig Dispense Refill    ubidecarenone/vitamin E mixed (COQ10  PO) Take 200 mg by mouth.  escitalopram oxalate (LEXAPRO) 10 mg tablet escitalopram 10 mg tablet   one po QD      methimazole (TAPAZOLE PO) methimazole       No current facility-administered medications on file prior to visit. No Known Allergies    OB History    No obstetric history on file. Obstetric Comments   Menarche 13, LMP 11/15/19, # of children 2, age of 4st delivery 27, Hysterectomy/oophorectomy no/no, Breast bx no, history of breast feeding yes, BCP yes, Hormone therapy no             ROS    Physical Exam  Exam conducted with a chaperone present. Cardiovascular:      Rate and Rhythm: Normal rate and regular rhythm. Heart sounds: Normal heart sounds. Pulmonary:      Breath sounds: Normal breath sounds. Chest:      Breasts: Breasts are symmetrical.         Right: Normal. No swelling, bleeding, inverted nipple, mass, nipple discharge, skin change or tenderness. Left: Normal. No swelling, bleeding, inverted nipple, mass, nipple discharge, skin change or tenderness. Lymphadenopathy:      Cervical:      Right cervical: No superficial, deep or posterior cervical adenopathy. Left cervical: No superficial, deep or posterior cervical adenopathy. Upper Body:      Right upper body: No supraclavicular or axillary adenopathy.       Left upper body: No supraclavicular or axillary adenopathy. ASSESSMENT and PLAN    ICD-10-CM ICD-9-CM    1. Fat necrosis of breast N64.1 611.3       Patient presents for f/u s/p LEFT breast excisional bx on 12/05/19, and is doing well overall. Well healed incision s/p excisional bx. Reviewed surgical PATH. F/U PRN. This plan was reviewed with the patient and patient agrees. All questions were answered.     Written by Farida Aburto, as dictated by Dr. Christophe Duverney, MD.

## 2019-12-27 NOTE — PROGRESS NOTES
HISTORY OF PRESENT ILLNESS  Tristin Simental is a 37 y.o. female. HPI  ESTABLISHED patient here for post op follow up, s/p LEFT breast excisional biopsy. She is doing well. Denies pain. Incisions are dry and intact. No redness or swelling.       12/5/19 - LEFT breast excisional biopsy  ROS    Physical Exam    ASSESSMENT and PLAN  {ASSESSMENT/PLAN:[id number]}

## 2019-12-27 NOTE — PROGRESS NOTES
HISTORY OF PRESENT ILLNESS  Maninder Vishal is a 37 y.o. female. HPI  ESTABLISHED patient here for post op follow up, s/p LEFT breast excisional biopsy. She is doing well. Denies pain. Incisions are dry and intact. No redness or swelling. 12/5/19 - LEFT breast excisional biopsy  FINAL PATHOLOGIC DIAGNOSIS   1. Breast, left medial margin, excision:   Breast tissue with fat necrosis, stromal fibrosis, and biopsy site reaction   Biopsy clip identified   No in situ or invasive carcinoma identified   2.  Breast, left, excision:   Breast tissue with fat necrosis, focal pseudo-lactational change, stromal fibrosis, and biopsy site reaction   No in situ or invasive carcinoma identified       Past Medical History:   Diagnosis Date    Thyroid disease     hyperthyroidism       Past Surgical History:   Procedure Laterality Date    HX BREAST BIOPSY Left 12/5/2019    LEFT BREAST EXCISIONAL BIOPSY WITH ULTRASOUND performed by Coleen Castillo MD at [facility]    HX WISDOM TEETH EXTRACTION         Social History     Socioeconomic History    Marital status:      Spouse name: Not on file    Number of children: Not on file    Years of education: Not on file    Highest education level: Not on file   Occupational History    Not on file   Social Needs    Financial resource strain: Not on file    Food insecurity:     Worry: Not on file     Inability: Not on file    Transportation needs:     Medical: Not on file     Non-medical: Not on file   Tobacco Use    Smoking status: Never Smoker    Smokeless tobacco: Never Used   Substance and Sexual Activity    Alcohol use: Yes     Comment: once a month    Drug use: Never    Sexual activity: Not on file   Lifestyle    Physical activity:     Days per week: Not on file     Minutes per session: Not on file    Stress: Not on file   Relationships    Social connections:     Talks on phone: Not on file     Gets together: Not on file     Attends Buddhist service: Not on file     Active member of club or organization: Not on file     Attends meetings of clubs or organizations: Not on file     Relationship status: Not on file    Intimate partner violence:     Fear of current or ex partner: Not on file     Emotionally abused: Not on file     Physically abused: Not on file     Forced sexual activity: Not on file   Other Topics Concern    Not on file   Social History Narrative    Not on file       Current Outpatient Medications on File Prior to Visit   Medication Sig Dispense Refill    ubidecarenone/vitamin E mixed (COQ10  PO) Take 200 mg by mouth.  escitalopram oxalate (LEXAPRO) 10 mg tablet escitalopram 10 mg tablet   one po QD      methimazole (TAPAZOLE PO) methimazole       No current facility-administered medications on file prior to visit. No Known Allergies    OB History    No obstetric history on file. Obstetric Comments   Menarche 13, LMP 11/15/19, # of children 2, age of 2st delivery 27, Hysterectomy/oophorectomy no/no, Breast bx no, history of breast feeding yes, BCP yes, Hormone therapy no             ROS    Physical Exam  Exam conducted with a chaperone present. Cardiovascular:      Rate and Rhythm: Normal rate and regular rhythm. Heart sounds: Normal heart sounds. Pulmonary:      Breath sounds: Normal breath sounds. Chest:      Breasts: Breasts are symmetrical.         Right: Normal. No swelling, bleeding, inverted nipple, mass, nipple discharge, skin change or tenderness. Left: Normal. No swelling, bleeding, inverted nipple, mass, nipple discharge, skin change or tenderness. Lymphadenopathy:      Cervical:      Right cervical: No superficial, deep or posterior cervical adenopathy. Left cervical: No superficial, deep or posterior cervical adenopathy. Upper Body:      Right upper body: No supraclavicular or axillary adenopathy.       Left upper body: No supraclavicular or axillary adenopathy. ASSESSMENT and PLAN    ICD-10-CM ICD-9-CM    1. Fat necrosis of breast N64.1 611.3       Patient presents for f/u s/p LEFT breast excisional bx on 12/05/19, and is doing well overall. Well healed incision s/p excisional bx. Reviewed surgical PATH. F/U PRN. This plan was reviewed with the patient and patient agrees. All questions were answered.     Written by Johnny Leal, as dictated by Dr. Blanca Gonzales MD.

## 2019-12-27 NOTE — PATIENT INSTRUCTIONS

## (undated) DEVICE — SYR 10ML LUER LOK 1/5ML GRAD --

## (undated) DEVICE — HANDLE LT SNAP ON ULT DURABLE LENS FOR TRUMPF ALC DISPOSABLE

## (undated) DEVICE — DRAPE,REIN 53X77,STERILE: Brand: MEDLINE

## (undated) DEVICE — Device

## (undated) DEVICE — SURGICAL PROCEDURE PACK BASIN MAJ SET CUST NO CAUT

## (undated) DEVICE — STERILE POLYISOPRENE POWDER-FREE SURGICAL GLOVES: Brand: PROTEXIS

## (undated) DEVICE — GARMENT,MEDLINE,DVT,INT,CALF,MED, GEN2: Brand: MEDLINE

## (undated) DEVICE — SMOKE EVACUATION PENCIL: Brand: VALLEYLAB

## (undated) DEVICE — (D)PREP SKN CHLRAPRP APPL 26ML -- CONVERT TO ITEM 371833

## (undated) DEVICE — DRAPE,LAPAROTOMY,T,PEDI,STERILE: Brand: MEDLINE

## (undated) DEVICE — COVER US PRB W5XL96IN LTX W/ GEL

## (undated) DEVICE — REM POLYHESIVE ADULT PATIENT RETURN ELECTRODE: Brand: VALLEYLAB

## (undated) DEVICE — DERMABOND SKIN ADH 0.7ML -- DERMABOND ADVANCED 12/BX

## (undated) DEVICE — SUTURE VCRL SZ 3-0 L27IN ABSRB UD L26MM SH 1/2 CIR J416H

## (undated) DEVICE — INFECTION CONTROL KIT SYS

## (undated) DEVICE — 1010 S-DRAPE TOWEL DRAPE 10/BX: Brand: STERI-DRAPE™

## (undated) DEVICE — SUT SLK 2-0SH 30IN BLK --

## (undated) DEVICE — SUTURE MCRYL SZ 4-0 L27IN ABSRB UD L19MM PS-2 1/2 CIR PRIM Y426H

## (undated) DEVICE — INSULATED BLADE ELECTRODE: Brand: EDGE

## (undated) DEVICE — SOLUTION IV 1000ML 0.9% SOD CHL